# Patient Record
Sex: OTHER/UNKNOWN | URBAN - METROPOLITAN AREA
[De-identification: names, ages, dates, MRNs, and addresses within clinical notes are randomized per-mention and may not be internally consistent; named-entity substitution may affect disease eponyms.]

---

## 2021-03-30 ENCOUNTER — APPOINTMENT (OUTPATIENT)
Age: 56
Setting detail: DERMATOLOGY
End: 2021-04-12

## 2021-03-30 VITALS
HEIGHT: 62 IN | HEART RATE: 104 BPM | SYSTOLIC BLOOD PRESSURE: 117 MMHG | WEIGHT: 210 LBS | DIASTOLIC BLOOD PRESSURE: 77 MMHG

## 2021-03-30 DIAGNOSIS — L84 CORNS AND CALLOSITIES: ICD-10-CM

## 2021-03-30 DIAGNOSIS — B07.8 OTHER VIRAL WARTS: ICD-10-CM

## 2021-03-30 PROCEDURE — OTHER COUNSELING: OTHER

## 2021-03-30 PROCEDURE — OTHER MIPS QUALITY: OTHER

## 2021-03-30 PROCEDURE — OTHER TREATMENT REGIMEN: OTHER

## 2021-03-30 PROCEDURE — OTHER PRESCRIPTION: OTHER

## 2021-03-30 PROCEDURE — 99203 OFFICE O/P NEW LOW 30 MIN: CPT

## 2021-03-30 RX ORDER — UREA 470 MG/G
47% CREAM TOPICAL QOHS
Qty: 1 | Refills: 0 | Status: ERX | COMMUNITY
Start: 2021-03-30

## 2021-03-30 ASSESSMENT — LOCATION SIMPLE DESCRIPTION DERM: LOCATION SIMPLE: LEFT GREAT TOE

## 2021-03-30 ASSESSMENT — LOCATION ZONE DERM: LOCATION ZONE: TOE

## 2021-03-30 ASSESSMENT — LOCATION DETAILED DESCRIPTION DERM: LOCATION DETAILED: LEFT DORSAL GREAT TOE

## 2021-03-30 NOTE — PROCEDURE: TREATMENT REGIMEN
Detail Level: Zone
Modify Regimen: Compound medication every other evening while callus is on top of wart then return to applying compound medication every evening
Initiate Treatment: Keralac 47 % topical cream: Apply to affected areas every other evening after washing
Otc Regimen: Recommended patient to: At home treatment: compound w(over the counter) after showering and filing down with nail file (emery board)

## 2021-03-30 NOTE — PROCEDURE: MIPS QUALITY
Quality 226: Preventive Care And Screening: Tobacco Use: Screening And Cessation Intervention: Patient screened for tobacco use and is an ex/non-smoker
Quality 110: Preventive Care And Screening: Influenza Immunization: Influenza Immunization not Administered because Patient Refused.
Detail Level: Detailed
Opt out